# Patient Record
Sex: FEMALE | Race: WHITE | Employment: STUDENT | URBAN - METROPOLITAN AREA
[De-identification: names, ages, dates, MRNs, and addresses within clinical notes are randomized per-mention and may not be internally consistent; named-entity substitution may affect disease eponyms.]

---
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
## 2021-08-06 NOTE — PROGRESS NOTES
Lori is a 20 year old who is being evaluated via a billable video visit.      Does patient have any form of state insurance? BCBS    Do you have wifi? yes  Do you have a smart phone? yes  Can you download an eben on your phone comfortably with out assistance? yes  Can you watch a Youtube video? yes      How would you like to obtain your AVS? Mail a copy  If the video visit is dropped, the invitation should be resent by: Text to cell phone: [phone]  Will anyone else be joining your video visit? Salome Paul CMA  Video-Visit Details  Type of service:  Video Visit    Video Start Time: 11:10 AM     Video End Time: 12:03 PM    Originating Location (pt. Location): Home    Distant Location (provider location):  [facility]     Platform used for Video Visit: [facility]  Sleep Consultation Note     Date on this visit: 8/9/2021    Lori Shafer is sent by No ref. provider found for a sleep consultation regarding insomnia    Primary Physician: Trena Pacheco     Chief complaint: Insomnia    Lori Shafer 20 year old with PMH of Somatic dysfunction of pelvis region, Thoracic segment dysfunction  , chr back pain, anxiety, who complains of insomnia.  This has been going on almost since her sophomore year in  high school.  She is starting her ofelia year in college at [place] in 2 weeks.  She was previously followed up at the [facility] in [place].  She was diagnosed with insomnia and delayed sleep phase.  She was recommended optimizing sleep hygiene.    She has been following up with Dr. Be Díaz, [place], [place] for the management of anxiety and insomnia. She is  currently alternating between hydroxyzine 100 mg/trazodone 100 mg(Traz works better but she feels more groggy upon awakening).  She has been on Hydroxyzine for few years years both for anxiety and for sleep. She was recommended referral to sleep provider by Dr. Díaz  at [facility] but she could not get the appointment scheduled.    She tried clonidine, that made her hypotensive/passing out so she discontinued the mediaction.  She had tried gabapentin in the past which did not make her feel tired or sleepy , so she discontinued.    She tried melatonin in the past and was taking up to 10 mg,  it was not helping, so she discontinued the medication.    She was recommended bright light box exposure in the past but does not use it.    She reports that since spring 2021 her sleep schedule has been out of control. There is no identifiable stressor or  precipitating factor . During spring semester 2021, 3 out of the 5 days she had the first class start at 9:30 AM.  She was going to bed between 3-4 AM, falling asleep between 6-7 AM and was waking up at 8:30 AM to attend the class but several times she missed the classes.  If there was no class she was waking up at 3:30 PM.   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  During the internship, sometimes she had to be up at 8 AM to be at work.  She reported going to bed around 2 AM, falling asleep between 3:30 to 4 AM and waking up in morning if she has  8 AM to be at work if she had to and if there was no need to be up at 8 AM, she was waking up on her own between 1-2 PM which she considers her desired wake up time. She has been following the similar schedule for the past 2 days since her return back from [place].  She reports that difficulty falling asleep. She is just tossing and turning the night , just lays down in the bed most of the time and if she feels restless she gets up to do something like maybe do homework or  watch  television show.    She takes naps during the day if she wakes up earlier around 8 AM, but does not nap  if she wakes up during  the afternoon hours.  She has been on trazodone 100 mg alternating with hydroxyzine 100mg and has been  following up with Dr. Be Díaz psychiatrist at [place].  She finds trazodone being very helpful but the problem that it works very well for her but it makes her feel very groggy in the morning after the hydroxyzine also makes her feel a little groggy in the morning upon awakening though not to the same magnitude as trazodone does.  She does report non restorative sleep, which she attributes to the grogginess from the medications.  But if she gets 8+ hours of sleep, she does not feel excessively groggy during the day.    She does report drowsiness while driving, but denies close calls or accidents due to drowsiness while driving.    Sleep Disordered Breathing  Lori does not report  snoring, snort arousals, choking/gasping for air, witnessed apneas.     Restless Legs symptoms  She also reported restlessness- racing mind,  feeling restless  in her whole body body, she just cannot get physically comfortable,  lately noticed antsy feeling particularly in her legs feeling as if there are pulses of energy within her legs that go up and down with an urge to move and feels better if she were to move and get up and has been affecting her sleep and symptoms occur after she goes to bed.  She was never diagnosed with anemia but she was having concerns with menorrhagia in the past and according to her mother about 3 or 4 years ago she was alternate day iron supplement  for 1 month.  Her mother and some of her mom's side of the family members have RLS.      Sleep Behaviors .  She denies any night time behaviors - sleep walking, sleep talking, sleep eating.  She does not complain acting out dreams.       Social History  Lori is starting her ofelia year in college at [place] in 2 weeks.   She drinks 3 caffeinated beverages a day . last caffeine intake is sometimes within 6 hours of bed time.  She drinks alcohol recreationally, but does not use alcohol as a sleep aid.  Patient reports vaping with nicotine and  wants to quit.  Patient uses marijuana occasionally.  She denies using cocaine, heroine or meth or medical marijuana    Allergies:    No Known Allergies    Medications:    Current Outpatient Medications   Medication Sig Dispense Refill     BIOTIN PO Take 1 tablet by mouth daily       Calcium-Phosphorus-Vitamin D (CALCIUM GUMMIES PO) Take 2 tablets by mouth daily       Cholecalciferol (VITAMIN D3 PO) Take 1,000 Units by mouth daily       cyclobenzaprine (FLEXERIL) 10 MG tablet Take 1 tablet (10 mg) by mouth 3 times daily as needed for muscle spasms (Patient not taking: Reported on 3/8/2017) 60 tablet 0     drospirenone-ethinyl estradiol (JOE) 3-0.02 MG per tablet Take 1 tablet by mouth daily       Fish Oil-Cholecalciferol (FISH OIL/D3 ADULT GUMMIES PO) Take 2 tablets by mouth daily       Lansoprazole (PREVACID PO) Take 15 mg by mouth every morning (before breakfast)       Multiple Vitamin (DAILY MULTIVITAMIN PO) Take 3 tablets by mouth daily       Naproxen Sodium (ALEVE PO) Take 440 mg by mouth 2 times daily (with meals)         Problem List:  Patient Active Problem List    Diagnosis Date Noted     Somatic dysfunction of pelvis region 05/28/2019     Priority: Medium     Thoracic segment dysfunction 05/28/2019     Priority: Medium     Hip pain, left 05/28/2019     Priority: Medium     Myalgia 05/28/2019     Priority: Medium     Chronic midline low back pain without sciatica 05/28/2019     Priority: Medium     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 It often mimics Achilles tendinitis and is treated with activity and shoe modifications, heel cups, and calf stretches       Health Care Home 09/14/2012     Priority: Medium     State Tier Level:  Tier 1  Status:  n/a  Care Coordinator:      See Letters for [facility] Care Plan           Allergic rhinitis 10/03/2011     Priority: Medium     Problem list name updated by automated  process. Provider to review          Past Medical/Surgical History:  No past medical history on file.  Past Surgical History:   Procedure Laterality Date     none         Social History:  Social History     Socioeconomic History     Marital status: Single     Spouse name: Not on file     Number of children: Not on file     Years of education: Not on file     Highest education level: Not on file   Occupational History     Not on file   Tobacco Use     Smoking status: Never Smoker     Smokeless tobacco: Never Used   Substance and Sexual Activity     Alcohol use: No     Drug use: No     Sexual activity: Not on file   Other Topics Concern      Service Not Asked     Blood Transfusions Not Asked     Caffeine Concern Not Asked     Occupational Exposure Not Asked     Hobby Hazards Not Asked     Sleep Concern No     Stress Concern Not Asked     Weight Concern Not Asked     Special Diet Not Asked     Back Care Not Asked     Exercise Not Asked     Bike Helmet Yes     Seat Belt Yes     Self-Exams Not Asked   Social History Narrative     Not on file     Social Determinants of Health     Financial Resource Strain:      Difficulty of Paying Living Expenses:    Food Insecurity:      Worried About Running Out of Food in the Last Year:      Ran Out of Food in the Last Year:    Transportation Needs:      Lack of Transportation (Medical):      Lack of Transportation (Non-Medical):    Physical Activity:      Days of Exercise per Week:      Minutes of Exercise per Session:    Stress:      Feeling of Stress :    Social Connections:      Frequency of Communication with Friends and Family:      Frequency of Social Gatherings with Friends and Family:      Attends Yarsanism Services:      Active Member of Clubs or Organizations:      Attends Club or Organization Meetings:      Marital Status:    Intimate Partner Violence:      Fear of Current or Ex-Partner:      Emotionally Abused:      Physically Abused:      Sexually Abused:         Family History:  Family History   Problem Relation Age of Onset     Diabetes Paternal Grandmother      Cancer Maternal Grandfather         esophageal/liver     Cancer - colorectal Paternal Grandmother      Prostate Cancer Paternal Grandfather      Heart Disease Father         arrhythmia     High cholesterol Maternal Grandmother        Physical Examination:  Vitals: There were no vitals taken for this visit.  BMI= There is no height or weight on file to calculate BMI.     General: No apparent distress, appropriately groomed  Head: Normocephalic, atraumatic  Chest: No cough, no audible wheezing, able to talk in full sentences  Skin: no rash  Psych: coherent speech, normal rate and volume, able to articulate logical thoughts, able   to abstract reason, no tangential thoughts, no hallucinations   or delusions  Her affect is normal  Neuro:  Mental status: Alert and  Oriented X 3  Speech: normal       Impression/Plan:  Chronic initiation insomnia -multifactorial.  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 Encouraged to follow good sleep hygiene/behavioral techniques.  Recommendations:    She was recommended to work on regularizing her sleep schedule while in [place] anticipating starting the ofelia year in college in next 2 weeks, we arrived at a goal bedtime of 12:30 AM  and a goal wake up time of 9:30 AM.  The goal bedtime and goal wake up time once she reaches [place] in 2 weeks are as follows 1:30 AM and 10:30 AM because her classes do not usually start until noon.    Minimize exposure to bright light a couple hours before bed    Avoiding use of electronic devices and screen time or mind stimulating activities before bed.    Encouraged exposure to natural bright light outdoors soon after awakening for half hour to 1 hour or using a bright light box of 10,000 Lux intensity soon after  awakening    Avoiding naps during the day    Changing the timing of the administration of the trazodone to 2 hours before her goal bedtime.      Since the medications have been making her groggy she was instructed to talk to Dr. Eileen Díaz to reduce the dosing to 75 mg of trazodone if needed under her psychiatrist supervision.    Recommended melatonin 1 mg 1/2 to 1 tablet by mouth about 5 hours prior to her habitual sleep time since she reports habitual sleep time is 4 AM, she was recommended melatonin administration at 11:00 PM.    CBT-I referral to sleep psychology may be helpful, when she is in [place].    Anxiety : We discussed the association of insomnia and anxiety. Recommended the patient to continue follow-up with her psychiatrist for optimizing the management of  mood disorder.      Sleep hygiene: Patient has been encouraged to quit vaping nicotine and marijuana use.  She was instructed to reduce the caffeine consumption to no more than 2 beverages a day and avoid caffeine within 6 hours before bed.  She was also instructed to avoid consumption of alcohol within 2 to 3 hours before bed.    Restless legs syndrome: will check iron function test that will include fasting serum ferritin level.  Will consider iron supplementation if less than 75ng/mL. Encouraged to try non-pharmacological treatments as well - hot bath/shower, stretching, heating pads, avoiding caffeine/alcohol/chocolate prior to bed.  Hydroxyzine can potentially worsen RLS and she was recommended to discontinue the medication and discuss it with her psychiatrist.  Gabapentin is usually the first line of treatment to manage RLS which also has sleep promoting and antianxiety effects.  Since she is returning back to [place] in 2 weeks to start her college she was recommended to follow-up with her psychiatrist and sleep provider locally and the [facility] for further management of the RLS.    Encouraged healthy diet, and exercise.    Patient  "was strongly advised to avoid driving, operating any heavy machinery or other hazardous situations while drowsy or sleepy.  Patient was counseled on the importance of driving while alert, to pull over if drowsy, or nap before getting into the vehicle if sleepy.      Follow up plan: she is returning back to [place] in 2 weeks to start her college so she will follow-up with her psychiatrist and sleep provider locally at [place] for  management of the insomnia and RLS.      CC: No ref. provider found      The above note was dictated using voice recognition software. Although reviewed after completion, some word and grammatical error may remain . Please contact the author for any clarifications.    \"I spent a total of  60 minutes with Lori Shafer during today's video visit., most  of this time was spent counseling the patient and  coordinating care regarding  Insomnia, stimulus control measures, sleep hygiene, anxiety, RLS, delayed sleep phase, regularization of sleep schedule, and chart review., including documentation and further activities as noted above.\"      Kristi Daniels MD  [facility] [address] [id number] [phone]  Dept: [phone]                      "

## 2021-08-09 ENCOUNTER — VIRTUAL VISIT (OUTPATIENT)
Dept: SLEEP MEDICINE | Facility: CLINIC | Age: 21
End: 2021-08-09
Payer: COMMERCIAL

## 2021-08-09 DIAGNOSIS — E83.19 OTHER DISORDERS OF IRON METABOLISM: Primary | ICD-10-CM

## 2021-08-09 PROCEDURE — 99205 OFFICE O/P NEW HI 60 MIN: CPT | Mod: 95 | Performed by: INTERNAL MEDICINE

## 2021-08-11 ENCOUNTER — TELEPHONE (OUTPATIENT)
Dept: SLEEP MEDICINE | Facility: CLINIC | Age: 21
End: 2021-08-11

## 2021-08-11 NOTE — TELEPHONE ENCOUNTER
Reason for call:  Other   Patient called regarding (reason for call): call back  Additional comments: Patient is calling because she was told she needs to schedule a lab test for iron levels, but there are no orders in her chart. Please add orders for patient to schedule. Please call patient.     Phone number to reach patient:  Home number on file [phone] (home)    Best Time:  Any time    Can we leave a detailed message on this number?  YES    Travel screening: Not Applicable

## 2021-08-13 NOTE — TELEPHONE ENCOUNTER
Reason for call:  Other   Patient called regarding (reason for call): call back  Additional comments: Per patient: following up regarding lab orders, is there anyway we can push these orders through before the weekend    Phone number to reach patient:  Cell number on file:    Telephone Information:   Mobile [phone]       Best Time:  anytime    Can we leave a detailed message on this number?  YES    Travel screening: Not Applicable

## 2021-08-13 NOTE — TELEPHONE ENCOUNTER
Returning patients call, left message informing patient lab orders are currently in her chart.     Marci Lyman MA

## 2021-08-17 ENCOUNTER — LAB (OUTPATIENT)
Dept: LAB | Facility: CLINIC | Age: 21
End: 2021-08-17
Payer: COMMERCIAL

## 2021-08-17 DIAGNOSIS — E83.19 OTHER DISORDERS OF IRON METABOLISM: ICD-10-CM

## 2021-08-17 PROCEDURE — 36415 COLL VENOUS BLD VENIPUNCTURE: CPT

## 2021-08-17 PROCEDURE — 82728 ASSAY OF FERRITIN: CPT

## 2021-08-17 PROCEDURE — 83550 IRON BINDING TEST: CPT

## 2021-08-18 LAB
FERRITIN SERPL-MCNC: 75 NG/ML (ref 12–150)
IRON SATN MFR SERPL: 26 % (ref 15–46)
IRON SERPL-MCNC: 89 UG/DL (ref 35–180)
TIBC SERPL-MCNC: 340 UG/DL (ref 240–430)

## 2021-09-03 ENCOUNTER — TELEPHONE (OUTPATIENT)
Dept: SLEEP MEDICINE | Facility: CLINIC | Age: 21
End: 2021-09-03

## 2021-09-03 NOTE — TELEPHONE ENCOUNTER
Reason for call:  Other   Patient called regarding (reason for call): call back  Additional comments: Patient is requesting a call back to hear the results of her blood labs she did per Dr Rollins's request    Phone number to reach patient:  Cell number on file:    Telephone Information:   Mobile [phone]       Best Time:  any    Can we leave a detailed message on this number?  YES    Travel screening: Negative

## 2021-09-07 NOTE — TELEPHONE ENCOUNTER
Patients mother called, asking for provider to contact patient with test results. I informed patients mother I could not speak to her in regards to this patient without a consent. Message will be forwarded to Dr. Rollins to contact patient with results.   Marci Lyman MA

## 2021-09-10 NOTE — PATIENT INSTRUCTIONS
Chronic insomnia -multifactorial.  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  If it takes more than half hour to fall asleep , please leave the bedroom go into a different room, keep the lights dim and engage in activities that are not mind stimulating, but are relaxing-such as reading a book, listening to music, meditation , yoga or deep breathing and return to bed when you are ready to sleep.    Encourage to follow good sleep hygiene/behavioral techniques.       quit vaping nicotine and marijuana use.      reduce the caffeine consumption to no more than 2 beverages a day and avoid caffeine within 6 hours before bed.      avoid  alcohol within 2 to 3 hours before bed.    Recommendations:    Recommended to work on regularizing her sleep schedule while in [place] anticipating starting the ofelia year in college in next 2 weeks, we arrived at a goal bedtime of 12:30 AM  and a goal wake up time of 9:30 AM.  The goal bedtime and goal wake up time once you reach [place] in 2 weeks are as follows 1:30 AM and 10:30 AM     Minimize exposure to bright light a couple hours before bed    Avoiding use of electronic devices and screen time or mind stimulating activities before bed.    Encourage exposure to natural bright light outdoors soon after awakening for half hour to 1 hour or using a bright light box of 10,000 Lux intensity soon after awakening    Avoiding naps during the day    Changing the timing of the administration of the trazodone to 2 hours before goal bedtime.      Since the medications have been making her groggy please talk to Dr. Be Díaz to reduce the dosing to 75 mg of trazodone  (under  psychiatrist supervision).    Recommended melatonin  1/2 to 1 tablet of 1 mg strength,  by mouth about 5 hours prior to her habitual sleep time since you have reported habitual sleep time  is 4 AM, recommend melatonin  at 11:00 PM.    Cognitive behavioral therapy CBT-I through referral to sleep psychology may be helpful, this is an option that you may explore if needed in [place].    Anxiety : We discussed the association of insomnia and anxiety. Recommend follow-up with   psychiatrist for optimizing the management of anxiety.    Restless legs syndrome(RLS) : will check iron function test that will include fasting serum ferritin level.  Will consider iron supplementation if less than 75ng/mL.   Encourage trying non-pharmacological treatments as well - hot bath/shower, stretching, heating pads, avoiding caffeine/alcohol/chocolate prior to bed.  Hydroxyzine can potentially worsen RLS and  recommend to discontinue the medication affter discussing it with your psychiatrist (any med changes need to be done under psychiatrist supervision).  Gabapentin is usually the first line of treatment to manage RLS which also has sleep promoting and antianxiety effects.  Since you are returning back to [place] in 2 weeks to start college,  recommend to follow-up with your psychiatrist and local sleep provider at [place] for further management of the insomnia and RLS.    Encourage  healthy diet, and exercise.    Please avoid driving, operating any heavy machinery or other hazardous situations while drowsy or sleepy.  Patient was counseled on the importance of driving while alert, to pull over if drowsy, or nap before getting into the vehicle if sleepy.

## 2021-09-13 ENCOUNTER — TEAM CONFERENCE (OUTPATIENT)
Dept: SLEEP MEDICINE | Facility: CLINIC | Age: 21
End: 2021-09-13

## 2021-09-14 NOTE — TELEPHONE ENCOUNTER
Patients phone number has been updated, message left for patients mother to contact sleep clinic to update patients college address.     Marci Lyman MA

## 2021-09-14 NOTE — TELEPHONE ENCOUNTER
Date: 9/14/2021  8:28AM  Communication of the lab test results    I called the telephone number [phone] listed as the contact number under the demographics in epic.  Patient's mother  picked up the phone and  I requested her to provide me with telephone number to contact Lori since she is an adult in order to communicate with her the lab  results. She provided me the telephone number to contact Lori Shafer at [phone].  I called the number and left a voice message informing her that the the blood tests that were obtained to check the iron levels were all within normal range.  I also requested her to call our sleep clinic to provide her mailing address, so that we can mail her  the after visit summary since she does not have my chart.    Kristi Daniels MD